# Patient Record
Sex: MALE | Race: WHITE | NOT HISPANIC OR LATINO | Employment: STUDENT | URBAN - METROPOLITAN AREA
[De-identification: names, ages, dates, MRNs, and addresses within clinical notes are randomized per-mention and may not be internally consistent; named-entity substitution may affect disease eponyms.]

---
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
## 2023-09-03 ENCOUNTER — NURSE TRIAGE (OUTPATIENT)
Dept: NURSING | Facility: CLINIC | Age: 18
End: 2023-09-03
Payer: COMMERCIAL

## 2023-09-03 NOTE — TELEPHONE ENCOUNTER
Mom calling. Just got back from college for the day. He has a blister inside of ear on the ridge. Mom gave the phone to her son. It's large, causing pain. It's on right ear. More like a small bump. It hurts with pressure on it. He will seek out care at an urgent care.  Gianna Pena RN  [facility]    Reason for Disposition   Earache  (Exceptions: brief ear pain of < 60 minutes duration, earache occurring during air travel    Additional Information   Negative: Moving the earlobe or touching the ear clearly increases the pain   Negative: Foreign body stuck in the ear (e.g., bug, piece of cotton)   Negative: Followed an ear injury   Negative: [1] Recently diagnosed with otitis media AND [2] currently on oral antibiotics   Negative: [1] Stiff neck (can't touch chin to chest) AND [2] fever   Negative: [1] Bony area of skull behind the ear is pink or swollen AND [2] fever   Negative: Fever > 104 F (40 C)   Negative: Patient sounds very sick or weak to the triager   Negative: [1] SEVERE pain AND [2] not improved 2 hours after taking analgesic medication (e.g., ibuprofen or acetaminophen)     When touching it pain at 2   Negative: Walking is very unsteady or feels very dizzy   Negative: Sudden onset of ear pain after long - thin object was inserted into the ear canal (e.g., pencil, Q-tip)   Negative: Diabetes mellitus or weak immune system (e.g., HIV positive, cancer chemo, splenectomy, organ transplant, chronic steroids)   Negative: New blurred vision or vision changes   Negative: White, yellow, or green discharge   Negative: Bloody discharge or unexplained bleeding from ear canal    Protocols used: Earache-A-AH

## 2023-09-04 ENCOUNTER — OFFICE VISIT (OUTPATIENT)
Dept: URGENT CARE | Facility: URGENT CARE | Age: 18
End: 2023-09-04
Payer: COMMERCIAL

## 2023-09-04 VITALS
TEMPERATURE: 97.7 F | BODY MASS INDEX: 24.56 KG/M2 | DIASTOLIC BLOOD PRESSURE: 71 MMHG | HEART RATE: 47 BPM | SYSTOLIC BLOOD PRESSURE: 116 MMHG | WEIGHT: 178.6 LBS | OXYGEN SATURATION: 100 %

## 2023-09-04 DIAGNOSIS — Q18.1 CYST OF PINNA: Primary | ICD-10-CM

## 2023-09-04 PROCEDURE — 99213 OFFICE O/P EST LOW 20 MIN: CPT | Performed by: INTERNAL MEDICINE

## 2023-09-04 NOTE — PROGRESS NOTES
ASSESSMENT AND PLAN:      ICD-10-CM    1. Cyst of pinna  Q18.1           Patient Instructions     Appears to be benign cyst.    Not infected  Doesn't appear to be insect bite    As slight defect in cartilage, potentially related to being congenital    Recommend seeing Otolaryngology ENT to further confirm diagnosis      Would not drain at this time, as could re-accumulate.  No follow-ups on file.        Leonie Asher MD  [facility]    Subjective     Benjamin R Dubinsky is a 17 year old who presents for Patient presents with:  Ear Problem: Start 1-2 weeks sx right ear bump on inner ear causing pain w/pressure tx none     an established patient of [facility].      Onset of symptoms was 1-2 week(s) ago.  Course of illness is worsening.    Noticed while cleaning ears  Current and Associated symptoms:  liquid filled bump on ear  Treatment measures tried include None tried.  Nurse line recommended evaluation    Review of Systems        Objective    /71   Pulse (!) 47   Temp 97.7  F (36.5  C)   Wt 81 kg (178 lb 9.6 oz)   SpO2 100%   BMI 24.56 kg/m    Physical Exam  Vitals reviewed.   Constitutional:       Appearance: Normal appearance.   HENT:      Ears:      Comments: right pinna 1 cm x 3/4 cm skin colored cyst - superior aspect   Soft.  Fluid filled  No redness, warmth note      Neurological:      Mental Status: He is alert.

## 2023-09-04 NOTE — PATIENT INSTRUCTIONS
Appears to be benign cyst.    Not infected  Doesn't appear to be insect bite    As slight defect in cartilage, potentially related to being congenital    Recommend seeing Otolaryngology ENT to further confirm diagnosis      Would not drain at this time, as could re-accumulate.

## 2023-11-26 ENCOUNTER — HEALTH MAINTENANCE LETTER (OUTPATIENT)
Age: 18
End: 2023-11-26

## 2024-07-15 ENCOUNTER — ANCILLARY PROCEDURE (OUTPATIENT)
Dept: GENERAL RADIOLOGY | Facility: CLINIC | Age: 19
End: 2024-07-15
Attending: PHYSICIAN ASSISTANT
Payer: COMMERCIAL

## 2024-07-15 ENCOUNTER — OFFICE VISIT (OUTPATIENT)
Dept: INTERNAL MEDICINE | Facility: CLINIC | Age: 19
End: 2024-07-15
Payer: COMMERCIAL

## 2024-07-15 VITALS
DIASTOLIC BLOOD PRESSURE: 69 MMHG | HEIGHT: 72 IN | HEART RATE: 54 BPM | BODY MASS INDEX: 24.11 KG/M2 | SYSTOLIC BLOOD PRESSURE: 118 MMHG | OXYGEN SATURATION: 98 % | WEIGHT: 178 LBS

## 2024-07-15 DIAGNOSIS — S69.92XA WRIST INJURY, LEFT, INITIAL ENCOUNTER: Primary | ICD-10-CM

## 2024-07-15 DIAGNOSIS — S69.92XA WRIST INJURY, LEFT, INITIAL ENCOUNTER: ICD-10-CM

## 2024-07-15 LAB — RADIOLOGIST FLAGS: ABNORMAL

## 2024-07-15 PROCEDURE — 99213 OFFICE O/P EST LOW 20 MIN: CPT | Performed by: PHYSICIAN ASSISTANT

## 2024-07-15 PROCEDURE — 73110 X-RAY EXAM OF WRIST: CPT | Mod: TC | Performed by: RADIOLOGY

## 2024-07-22 ENCOUNTER — ANCILLARY PROCEDURE (OUTPATIENT)
Dept: GENERAL RADIOLOGY | Facility: CLINIC | Age: 19
End: 2024-07-22
Attending: FAMILY MEDICINE
Payer: COMMERCIAL

## 2024-07-22 ENCOUNTER — OFFICE VISIT (OUTPATIENT)
Dept: FAMILY MEDICINE | Facility: CLINIC | Age: 19
End: 2024-07-22
Payer: COMMERCIAL

## 2024-07-22 VITALS
OXYGEN SATURATION: 97 % | RESPIRATION RATE: 16 BRPM | WEIGHT: 173 LBS | BODY MASS INDEX: 23.43 KG/M2 | TEMPERATURE: 98.3 F | SYSTOLIC BLOOD PRESSURE: 110 MMHG | HEIGHT: 72 IN | HEART RATE: 77 BPM | DIASTOLIC BLOOD PRESSURE: 70 MMHG

## 2024-07-22 DIAGNOSIS — M25.532 LEFT WRIST PAIN: ICD-10-CM

## 2024-07-22 DIAGNOSIS — M25.532 LEFT WRIST PAIN: Primary | ICD-10-CM

## 2024-07-22 PROCEDURE — 99213 OFFICE O/P EST LOW 20 MIN: CPT | Performed by: FAMILY MEDICINE

## 2024-07-22 PROCEDURE — 73110 X-RAY EXAM OF WRIST: CPT | Mod: TC | Performed by: RADIOLOGY

## 2024-07-22 ASSESSMENT — PAIN SCALES - GENERAL: PAINLEVEL: SEVERE PAIN (6)
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
## 2024-08-17 ENCOUNTER — OFFICE VISIT (OUTPATIENT)
Dept: ORTHOPEDICS | Facility: CLINIC | Age: 19
End: 2024-08-17
Attending: FAMILY MEDICINE
Payer: COMMERCIAL

## 2024-08-17 ENCOUNTER — ANCILLARY PROCEDURE (OUTPATIENT)
Dept: GENERAL RADIOLOGY | Facility: CLINIC | Age: 19
End: 2024-08-17
Attending: PHYSICIAN ASSISTANT
Payer: COMMERCIAL

## 2024-08-17 VITALS
DIASTOLIC BLOOD PRESSURE: 72 MMHG | HEIGHT: 71 IN | HEART RATE: 61 BPM | BODY MASS INDEX: 24.22 KG/M2 | WEIGHT: 173 LBS | SYSTOLIC BLOOD PRESSURE: 111 MMHG

## 2024-08-17 DIAGNOSIS — S69.92XD INJURY OF LEFT WRIST, SUBSEQUENT ENCOUNTER: ICD-10-CM

## 2024-08-17 DIAGNOSIS — S62.022A CLOSED TRANSVERSE FRACTURE OF WAIST OF SCAPHOID OF LEFT WRIST, INITIAL ENCOUNTER: Primary | ICD-10-CM

## 2024-08-17 PROCEDURE — 73110 X-RAY EXAM OF WRIST: CPT | Mod: TC | Performed by: RADIOLOGY

## 2024-08-17 PROCEDURE — 99203 OFFICE O/P NEW LOW 30 MIN: CPT | Performed by: PHYSICIAN ASSISTANT

## 2024-08-17 PROCEDURE — 29085 APPL CAST HAND&LWR FOREARM: CPT | Mod: LT | Performed by: PHYSICIAN ASSISTANT

## 2024-08-17 NOTE — PATIENT INSTRUCTIONS
1. Closed transverse fracture of waist of scaphoid of left wrist, initial encounter    2. Injury of left wrist, subsequent encounter        -Patient seen for continued left wrist pain following an injury 5 weeks ago.  He did state that it was improving initially but then plateaued and continued to have pain.  His examination he is tender about the scaphoid region remainder of the wrist was nontender.  X-rays reveal waist fracture of the scaphoid bone.  -Recommend a thumb spica splint IP free for the next 7 weeks given his date of injury of 7/12/2024.  We discussed that this type of fracture does take 12 weeks to heal.  -He tells me he is going to college in [employer] [facility] and have encouraged him to follow-up with a local orthopedic hand surgeon or sports medicine provider in 7 weeks from today.  -He should keep his cast clean and dry and should it get wet seek urgent care to have it removed if needed.    -Call direct clinic number [[phone]] at any time with questions or concerns.    -Orthopedic Walk in Monday through Thursday 8 am to 6 pm, Friday 8 am to 5 pm, Saturday 8 am to 12 pm at [address] [address].
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
## 2024-08-19 ENCOUNTER — TELEPHONE (OUTPATIENT)
Dept: ORTHOPEDICS | Facility: CLINIC | Age: 19
End: 2024-08-19

## 2024-08-19 NOTE — TELEPHONE ENCOUNTER
Called and left brief message requesting callback as information is best-suited for a conversation versus lengthy message. Callback number left for patient.   Team to call father back when he is available. Pass along Gemma's message/education.   Kana Flores ATC

## 2024-08-19 NOTE — TELEPHONE ENCOUNTER
The patient's father returned my call, and with the help of the call center was able to get connected to me then.  He had some questions regarding the timeline of his son's injury, and more on why it may have taken a while to diagnose a fracture.  Father concerned with conflicting messages from different providers and the radiology reads of each serial x-ray.  I walked him through the significance of the scaphoid bone including its poor blood supply and tendency to not heal well.  I also talked about how different x-ray views may be difficult to interpret pathologies of the bone.  Father also wanted to know why a thumb spica splint was recommended in the follow-up, as well as a cast.  I did not have all the information as to why that might have been recommended, though I was able to stress to him how the cast is the standard of care for these types of fracture, which we have to baby, as the bone will not heal well if it displaces, and as of last visit it was nondisplaced which is more reassuring.  The father understood this, and was more accepting of the cast.  In terms of follow-up care, the father was wondering how our records can get to a clinic in [facility], I instructed him that once they set up an appointment and let us know where to act, we are happy to send over records or facilitate that process.  We recommend scheduling that 7-week follow-up sooner, rather than later to help increase accessibility for the patient and allow records to be received.  Father/son instructed to let us know via [employer] which clinic day and time they are planning to be seen in [place] that we can help him.  Father was agreeable to this plan.  Father was appreciative of the many explanations that were given and regarding to his son's care.    Kana Flores, ATC

## 2024-08-19 NOTE — TELEPHONE ENCOUNTER
Other: pt is calling Kana back to discuss.      Could we send this information to you in [employer] or would you prefer to receive a phone call?:   Patient would prefer a phone call   Okay to leave a detailed message?: Yes at Cell number on file:    Telephone Information:   Mobile [phone]

## 2024-08-19 NOTE — TELEPHONE ENCOUNTER
Please advise fractures do not always appear the first time and so repeat imaging is usually done about 10-14 days post injury. Scaphoid waist fractures typically take 12 weeks to heal and conservative treatment is casting to stabilize the fracture to allow it to heal. The scaphoid has a poor blood supply to the proximal portion and so a cast is needed to ensure that it will heal. If the bone does not heal in 12 weeks, then surgery may be needed vs need for a bone stimulator to promote healing. About 90% of the time these heal with casting alone. If no treatment is done and the bone does not heal, this will lead to a typical pattern of arthritis of the wrist (Radiocarpal) joint that will cause chronic pain and typically leads to a partial wrist fusion sooner than if no fracture in life. Typically about 10 years.     If they would like another opinion they are welcome to that but the options will be the same of non operative with a cast or surgery.

## 2024-08-19 NOTE — TELEPHONE ENCOUNTER
Other: Jermain Ordoñez(father) is calling because Ramiro was seen in 7/2024, seen 8/2024 and for third time xrays was taken at all three visits. Was seen 8/17/2024 was finally told that it was broken and why was it caught the 1st or send time? He is questions about does he (Ramiro) really need the cast? Or could he just go to just wearing a splint? He is wondering if someone else could look at the xrays again. I got verbal permission from Ramiro today 8/19/2024 at 9:02AM . So you able to speak with Jermain     Could we send this information to you in [employer] or would you prefer to receive a phone call?:   Patient would prefer a phone call   Okay to leave a detailed message?: Yes at Other phone number:  [phone]

## 2024-08-19 NOTE — TELEPHONE ENCOUNTER
Please see reason for call and advise.     Pilar Velez, BRAYDEN, LAT, ATC  Certified Athletic Trainer

## 2024-08-20 ENCOUNTER — TELEPHONE (OUTPATIENT)
Dept: ORTHOPEDICS | Facility: CLINIC | Age: 19
End: 2024-08-20
Payer: COMMERCIAL

## 2024-08-20 ENCOUNTER — MYC MEDICAL ADVICE (OUTPATIENT)
Dept: ORTHOPEDICS | Facility: CLINIC | Age: 19
End: 2024-08-20
Payer: COMMERCIAL

## 2024-08-20 NOTE — TELEPHONE ENCOUNTER
Yes he can come back if he wishes for another visit and cast check. I would advise that the position he is in is the appropriate position but we can inspect and check his cast if he wishes. Thank you

## 2024-08-20 NOTE — TELEPHONE ENCOUNTER
Other: Requesting c/b from Kana GLORIA- go over next steps     Could we send this information to you in [email or web address] or would you prefer to receive a phone call?:   Patient would prefer a phone call   Okay to leave a detailed message?: No at Cell number on file:    Telephone Information:   Mobile [phone]

## 2024-08-20 NOTE — TELEPHONE ENCOUNTER
Called and spoke with patient regarding his cast concerns.  After confirming Gemma, he will report to the clinic tomorrow morning around 8 AM for a cast check.  He was also instructed to fill out a release of information at the  on his way in so we can send records to his clinic in [facility].     Kana Flores ATC

## 2024-08-20 NOTE — LETTER
August 26, 2024      Benjamin R Dubinsky  [address] [address] [address]        To Whom It May Concern:    Benjamin R Dubinsky was seen in our clinic. He has a wrist bone fracture that typically will heal after 12 weeks from date of injury. During this time frame it is important that Ramiro be in a cast to protect and immobilize his fracture site to promote healing. He will require extra time to complete assignments and tests as he will not be able to write normally due to cast position. His cast cannot get wet otherwise will need to be replaced.      Sincerely,        Gemma Clark PA-C

## 2024-08-20 NOTE — TELEPHONE ENCOUNTER
I returned the patient's call, he had some concerns with his current cast and wanted to review the care plan.  Patient states that in his thumb spica cast, his thumb is abducted further than he would like/in in a position that would not allow opposition with the pinky.  He also states that cast padding in the palmar crease and underlying cast material is starting to wear and fray, he has concern that this will soon start to rub into his skin.  He is most interested in seeing if he would be welcomed back into walk-in care with Gemma to have a new cast put on before he leaves [facility] on Friday the 23rd.  He would want his thumb in a slightly more functional position if he is to be casted for 7 more weeks. we reviewed that since I was not at that appointment, I cannot comment on the status of his cast or positioning.  I told him I would advise with Gemma when he returns in the afternoon, and see what we can do for him.  I reminded him that with this fracture, we do want to immobilize the thumb as well as minimize usage of the hand, and how continued use of the digits can cause wearing at the palmar crease where it needs a cast.  I also reviewed with him that he should work towards making an orthopedic appointment in [place], so that we can send them his documentation for better continued care.  Will call patient later with update from Gemma, and advise on when he can appropriately walk into clinic or make an appointment to be seen for his cast, if indicated.     Kana Flores, ATC

## 2024-08-21 ENCOUNTER — OFFICE VISIT (OUTPATIENT)
Dept: ORTHOPEDICS | Facility: CLINIC | Age: 19
End: 2024-08-21
Payer: COMMERCIAL

## 2024-08-21 VITALS
SYSTOLIC BLOOD PRESSURE: 120 MMHG | HEIGHT: 72 IN | WEIGHT: 173 LBS | DIASTOLIC BLOOD PRESSURE: 72 MMHG | BODY MASS INDEX: 23.43 KG/M2

## 2024-08-21 DIAGNOSIS — S62.022A CLOSED TRANSVERSE FRACTURE OF WAIST OF SCAPHOID OF LEFT WRIST, INITIAL ENCOUNTER: Primary | ICD-10-CM

## 2024-08-21 PROCEDURE — 99207 PR DROP WITH A PROCEDURE: CPT | Performed by: PHYSICIAN ASSISTANT

## 2024-08-21 PROCEDURE — 29085 APPL CAST HAND&LWR FOREARM: CPT | Mod: LT | Performed by: PHYSICIAN ASSISTANT

## 2024-08-21 NOTE — PROGRESS NOTES
"ASSESSMENT & PLAN    Patient Instructions     1. Closed transverse fracture of waist of scaphoid of left wrist, initial encounter        -Patient presents today for evaluation of his cast with concerns.  We did review his pathology of his scaphoid fracture and the nature of healing.  We reviewed the importance of casting as well today.  -His cast was removed and a new cast applied.  -Recommend he purchase a shower shield bag or cast leg off of [employer] or [employer] to protect his cast from getting wet.   - Follow-up in [place] in approximately 7 weeks for repeat checkup.    -Call direct clinic number [[phone]] at any time with questions or concerns.    -Orthopedic Walk in Monday through Thursday 8 am to 6 pm, Friday 8 am to 5 pm, Saturday 8 am to 12 pm at [address] [place].        Gemma ESPAÑA [facility] Sports Medicine    -----  Chief Complaint   Patient presents with    Left Wrist - Follow Up       SUBJECTIVE  Benjamin R Dubinsky is a/an 18 year old male who is right hand dominant is seen in follow-up. The patient was last seen on 8/17/24. Since last visit, he has been having issues with his cast. Mainly, he is having discomfort through the hand and at the forearm, where the casting material is \"softer.\" Patient is interested in getting a new cast and also repositioning the thumb.     The patient is seen alone        REVIEW OF SYSTEMS:  Pertinent positives/negative: As stated above in HPI    OBJECTIVE:  /72   Ht 1.816 m (5' 11.5\")   Wt 78.5 kg (173 lb)   BMI 23.79 kg/m     General: Alert and in no distress  Skin: no visable rashes  CV: Extremities appear well perfused   Resp: normal respiratory effort, no conversational dyspnea   Psych: normal mood, affect  MSK:    Cast is removed.  Skin is intact.  Sensations intact to light touch.  He can make a fist and extend his fingers.  Radial pulses palpable and equal to contralateral extremity.    RADIOLOGY:  No new " imaging    Cast/splint application    Date/Time: 8/21/2024 10:19 AM    Performed by: Gemma Clark PA-C  Authorized by: Gemma Clark PA-C    Consent:     Consent obtained:  Verbal    Consent given by:  Patient    Risks discussed:  Pain, numbness and swelling    Alternatives discussed:  Alternative treatment  Pre-procedure details:     Sensation:  Normal  Procedure details:     Laterality:  Right    Location:  Wrist    Wrist:  R wrist    Cast type: Thumb spica.    Supplies:  Fiberglass  Post-procedure details:     Pain:  Improved    Sensation:  Normal    Patient tolerance of procedure:  Tolerated well, no immediate complications    Patient provided with cast or splint care instructions: Yes

## 2024-08-21 NOTE — LETTER
"8/21/2024      Benjamin R Dubinsky  [address]  [place] [address]      Dear Colleague,    Thank you for referring your patient, Benjamin R Dubinsky, to the [facility] MEDICINE CLIN[facility] [place]. Please see a copy of my visit note below.    ASSESSMENT & PLAN    Patient Instructions     1. Closed transverse fracture of waist of scaphoid of left wrist, initial encounter        -Patient presents today for evaluation of his cast with concerns.  We did review his pathology of his scaphoid fracture and the nature of healing.  We reviewed the importance of casting as well today.  -His cast was removed and a new cast applied.  -Recommend he purchase a shower shield bag or cast leg off of [employer] or [employer] to protect his cast from getting wet.   - Follow-up in [place] in approximately 7 weeks for repeat checkup.    -Call direct clinic number [[phone]] at any time with questions or concerns.    -Orthopedic Walk in Monday through Thursday 8 am to 6 pm, Friday 8 am to 5 pm, Saturday 8 am to 12 pm at [address] [place].        Gemma Clark PA-C  [facility] Sports Medicine    -----  Chief Complaint   Patient presents with     Left Wrist - Follow Up       SUBJECTIVE  Benjamin R Dubinsky is a/an 18 year old male who is right hand dominant is seen in follow-up. The patient was last seen on 8/17/24. Since last visit, he has been having issues with his cast. Mainly, he is having discomfort through the hand and at the forearm, where the casting material is \"softer.\" Patient is interested in getting a new cast and also repositioning the thumb.     The patient is seen alone        REVIEW OF SYSTEMS:  Pertinent positives/negative: As stated above in HPI    OBJECTIVE:  /72   Ht 1.816 m (5' 11.5\")   Wt 78.5 kg (173 lb)   BMI 23.79 kg/m     General: Alert and in no distress  Skin: no visable rashes  CV: Extremities appear well perfused   Resp: normal respiratory effort, " no conversational dyspnea   Psych: normal mood, affect  MSK:    Cast is removed.  Skin is intact.  Sensations intact to light touch.  He can make a fist and extend his fingers.  Radial pulses palpable and equal to contralateral extremity.    RADIOLOGY:  No new imaging    Cast/splint application    Date/Time: 8/21/2024 10:19 AM    Performed by: Gemma Clark PA-C  Authorized by: Gemma Clark PA-C    Consent:     Consent obtained:  Verbal    Consent given by:  Patient    Risks discussed:  Pain, numbness and swelling    Alternatives discussed:  Alternative treatment  Pre-procedure details:     Sensation:  Normal  Procedure details:     Laterality:  Right    Location:  Wrist    Wrist:  R wrist    Cast type: Thumb spica.    Supplies:  Fiberglass  Post-procedure details:     Pain:  Improved    Sensation:  Normal    Patient tolerance of procedure:  Tolerated well, no immediate complications    Patient provided with cast or splint care instructions: Yes               Again, thank you for allowing me to participate in the care of your patient.        Sincerely,        Gemma Clark PA-C

## 2024-08-21 NOTE — PATIENT INSTRUCTIONS
1. Closed transverse fracture of waist of scaphoid of left wrist, initial encounter        -Patient presents today for evaluation of his cast with concerns.  We did review his pathology of his scaphoid fracture and the nature of healing.  We reviewed the importance of casting as well today.  -His cast was removed and a new cast applied.  -Recommend he purchase a shower shield bag or cast leg off of [employer] or [employer] to protect his cast from getting wet.   - Follow-up in [place] in approximately 7 weeks for repeat checkup.    -Call direct clinic number [[phone]] at any time with questions or concerns.    -Orthopedic Walk in Monday through Thursday 8 am to 6 pm, Friday 8 am to 5 pm, Saturday 8 am to 12 pm at [address] [address] [place].

## 2024-08-26 NOTE — TELEPHONE ENCOUNTER
Please see attachments when considering writing a detailed accommodations letter for the student as he approaches a new school year while managing his injury in a short arm thumb spica [email or web address]  His letter can be sent to the patient via [employer].    Kana Flores ATC

## 2025-02-02 ENCOUNTER — HEALTH MAINTENANCE LETTER (OUTPATIENT)
Age: 20
End: 2025-02-02